# Patient Record
Sex: FEMALE | Race: WHITE | Employment: OTHER | URBAN - METROPOLITAN AREA
[De-identification: names, ages, dates, MRNs, and addresses within clinical notes are randomized per-mention and may not be internally consistent; named-entity substitution may affect disease eponyms.]

---

## 2019-07-14 ENCOUNTER — HOSPITAL ENCOUNTER (EMERGENCY)
Facility: CLINIC | Age: 84
Discharge: HOME | End: 2019-07-14
Attending: PHYSICIAN ASSISTANT | Admitting: PHYSICIAN ASSISTANT
Payer: COMMERCIAL

## 2019-07-14 ENCOUNTER — APPOINTMENT (OUTPATIENT)
Dept: GENERAL RADIOLOGY | Facility: CLINIC | Age: 84
End: 2019-07-14
Attending: PHYSICIAN ASSISTANT
Payer: COMMERCIAL

## 2019-07-14 VITALS
OXYGEN SATURATION: 94 % | BODY MASS INDEX: 28.32 KG/M2 | TEMPERATURE: 98.4 F | HEART RATE: 82 BPM | RESPIRATION RATE: 22 BRPM | HEIGHT: 65 IN | SYSTOLIC BLOOD PRESSURE: 151 MMHG | DIASTOLIC BLOOD PRESSURE: 80 MMHG | WEIGHT: 170 LBS

## 2019-07-14 DIAGNOSIS — R42 DIZZINESS: ICD-10-CM

## 2019-07-14 DIAGNOSIS — R06.02 SHORTNESS OF BREATH: ICD-10-CM

## 2019-07-14 LAB
ALBUMIN UR-MCNC: NEGATIVE MG/DL
ANION GAP SERPL CALCULATED.3IONS-SCNC: 2 MMOL/L (ref 3–14)
APPEARANCE UR: CLEAR
BACTERIA #/AREA URNS HPF: ABNORMAL /HPF
BASOPHILS # BLD AUTO: 0.1 10E9/L (ref 0–0.2)
BASOPHILS NFR BLD AUTO: 0.9 %
BILIRUB UR QL STRIP: NEGATIVE
BUN SERPL-MCNC: 11 MG/DL (ref 7–30)
CALCIUM SERPL-MCNC: 10.5 MG/DL (ref 8.5–10.1)
CHLORIDE SERPL-SCNC: 105 MMOL/L (ref 94–109)
CO2 SERPL-SCNC: 35 MMOL/L (ref 20–32)
COLOR UR AUTO: YELLOW
CREAT SERPL-MCNC: 0.68 MG/DL (ref 0.52–1.04)
DIFFERENTIAL METHOD BLD: NORMAL
EOSINOPHIL # BLD AUTO: 0.3 10E9/L (ref 0–0.7)
EOSINOPHIL NFR BLD AUTO: 4.5 %
ERYTHROCYTE [DISTWIDTH] IN BLOOD BY AUTOMATED COUNT: 11.9 % (ref 10–15)
GFR SERPL CREATININE-BSD FRML MDRD: 79 ML/MIN/{1.73_M2}
GLUCOSE SERPL-MCNC: 112 MG/DL (ref 70–99)
GLUCOSE UR STRIP-MCNC: NEGATIVE MG/DL
HCT VFR BLD AUTO: 43.8 % (ref 35–47)
HGB BLD-MCNC: 13.9 G/DL (ref 11.7–15.7)
HGB UR QL STRIP: NEGATIVE
IMM GRANULOCYTES # BLD: 0 10E9/L (ref 0–0.4)
IMM GRANULOCYTES NFR BLD: 0.1 %
KETONES UR STRIP-MCNC: NEGATIVE MG/DL
LEUKOCYTE ESTERASE UR QL STRIP: ABNORMAL
LYMPHOCYTES # BLD AUTO: 1.5 10E9/L (ref 0.8–5.3)
LYMPHOCYTES NFR BLD AUTO: 20.4 %
MCH RBC QN AUTO: 30.3 PG (ref 26.5–33)
MCHC RBC AUTO-ENTMCNC: 31.7 G/DL (ref 31.5–36.5)
MCV RBC AUTO: 96 FL (ref 78–100)
MONOCYTES # BLD AUTO: 0.7 10E9/L (ref 0–1.3)
MONOCYTES NFR BLD AUTO: 9.7 %
MUCOUS THREADS #/AREA URNS LPF: PRESENT /LPF
NEUTROPHILS # BLD AUTO: 4.8 10E9/L (ref 1.6–8.3)
NEUTROPHILS NFR BLD AUTO: 64.4 %
NITRATE UR QL: NEGATIVE
NRBC # BLD AUTO: 0 10*3/UL
NRBC BLD AUTO-RTO: 0 /100
NT-PROBNP SERPL-MCNC: 53 PG/ML (ref 0–1800)
PH UR STRIP: 6.5 PH (ref 5–7)
PLATELET # BLD AUTO: 233 10E9/L (ref 150–450)
POTASSIUM SERPL-SCNC: 4.4 MMOL/L (ref 3.4–5.3)
RBC # BLD AUTO: 4.58 10E12/L (ref 3.8–5.2)
RBC #/AREA URNS AUTO: 3 /HPF (ref 0–2)
SODIUM SERPL-SCNC: 142 MMOL/L (ref 133–144)
SOURCE: ABNORMAL
SP GR UR STRIP: 1.02 (ref 1–1.03)
SQUAMOUS #/AREA URNS AUTO: 2 /HPF (ref 0–1)
TROPONIN I SERPL-MCNC: <0.015 UG/L (ref 0–0.04)
TROPONIN I SERPL-MCNC: <0.015 UG/L (ref 0–0.04)
UROBILINOGEN UR STRIP-MCNC: NORMAL MG/DL (ref 0–2)
WBC # BLD AUTO: 7.4 10E9/L (ref 4–11)
WBC #/AREA URNS AUTO: 7 /HPF (ref 0–5)

## 2019-07-14 PROCEDURE — 96360 HYDRATION IV INFUSION INIT: CPT

## 2019-07-14 PROCEDURE — 84484 ASSAY OF TROPONIN QUANT: CPT | Mod: 91 | Performed by: PHYSICIAN ASSISTANT

## 2019-07-14 PROCEDURE — 99285 EMERGENCY DEPT VISIT HI MDM: CPT | Mod: 25

## 2019-07-14 PROCEDURE — 25000125 ZZHC RX 250: Performed by: PHYSICIAN ASSISTANT

## 2019-07-14 PROCEDURE — 80048 BASIC METABOLIC PNL TOTAL CA: CPT | Performed by: PHYSICIAN ASSISTANT

## 2019-07-14 PROCEDURE — 85025 COMPLETE CBC W/AUTO DIFF WBC: CPT | Performed by: PHYSICIAN ASSISTANT

## 2019-07-14 PROCEDURE — 93005 ELECTROCARDIOGRAM TRACING: CPT

## 2019-07-14 PROCEDURE — 81001 URINALYSIS AUTO W/SCOPE: CPT | Performed by: PHYSICIAN ASSISTANT

## 2019-07-14 PROCEDURE — 71046 X-RAY EXAM CHEST 2 VIEWS: CPT

## 2019-07-14 PROCEDURE — 25000128 H RX IP 250 OP 636: Performed by: PHYSICIAN ASSISTANT

## 2019-07-14 PROCEDURE — 83880 ASSAY OF NATRIURETIC PEPTIDE: CPT | Performed by: PHYSICIAN ASSISTANT

## 2019-07-14 PROCEDURE — 94640 AIRWAY INHALATION TREATMENT: CPT

## 2019-07-14 RX ORDER — IPRATROPIUM BROMIDE AND ALBUTEROL SULFATE 2.5; .5 MG/3ML; MG/3ML
3 SOLUTION RESPIRATORY (INHALATION) ONCE
Status: COMPLETED | OUTPATIENT
Start: 2019-07-14 | End: 2019-07-14

## 2019-07-14 RX ORDER — MECLIZINE HYDROCHLORIDE 25 MG/1
25 TABLET ORAL ONCE
Status: DISCONTINUED | OUTPATIENT
Start: 2019-07-14 | End: 2019-07-14 | Stop reason: HOSPADM

## 2019-07-14 RX ADMIN — IPRATROPIUM BROMIDE AND ALBUTEROL SULFATE 3 ML: .5; 3 SOLUTION RESPIRATORY (INHALATION) at 14:42

## 2019-07-14 RX ADMIN — SODIUM CHLORIDE 500 ML: 9 INJECTION, SOLUTION INTRAVENOUS at 14:41

## 2019-07-14 ASSESSMENT — ENCOUNTER SYMPTOMS
DIZZINESS: 1
FEVER: 0
SHORTNESS OF BREATH: 1
COUGH: 1

## 2019-07-14 ASSESSMENT — MIFFLIN-ST. JEOR: SCORE: 1206.99

## 2019-07-14 NOTE — ED PROVIDER NOTES
History     Chief Complaint:    Shortness of Breath and Dizziness     HPI   Leeanne Cowan is a 87 year old female who presents with shortness of breath and dizziness. The patient report that recently the heat has been bothering her and she has been feeling unwell. She notes that this morning she started to develop shortness of breath while she was walking with associated dizziness like she will fall over. The patient expresses that she also has had a dry nonproductive cough recently. She denies current dizziness, chest pain, leg swelling, or fever. She recalls that she had a DVT after pregnancy.     Allergies:  Sulfa Drugs    Meloxicam     Medications:    Flonase    Past Medical History:    DVT  Lumbar spondylosis  Allergies  KINDRA  Mixed hearing loss  BPPV  Macular hole  Atrial fibrillation  Cardiac dysrhythmia  Aortic dilation  COPD  Sick sinus syndrome  Basal cell cancer  Thoracic aortic aneurysm     Past Surgical History:    Cardiac pacemaker  Appendectomy   Cholecystectomy  Cataract extraction  Hysterectomy  MOHS Surgery  Knee surgery     Family History:    Father: heart disease  Mother: stroke  Cousin: breat cancer     Social History:  The patient was accompanied to the ED by daughter.  Smoking Status: Former Smoker  Smokeless Tobacco: Never Used  Alcohol Use: Positive  Drug Use: Negative  Marital Status:        Review of Systems   Constitutional: Negative for fever.   Respiratory: Positive for cough and shortness of breath.    Cardiovascular: Negative for chest pain and leg swelling.   Neurological: Positive for dizziness.   All other systems reviewed and are negative.     Physical Exam     Patient Vitals for the past 24 hrs:   BP Temp Temp src Pulse Heart Rate Resp SpO2 Height Weight   07/14/19 1531 -- -- -- -- -- -- 94 % -- --   07/14/19 1530 151/80 -- -- 82 79 -- -- -- --   07/14/19 1515 -- -- -- -- 78 -- 94 % -- --   07/14/19 1500 146/81 -- -- 76 -- -- 100 % -- --   07/14/19 1445 144/83 -- --  "-- 76 -- 93 % -- --   07/14/19 1415 -- -- -- -- 78 -- 94 % -- --   07/14/19 1315 135/79 -- -- 80 75 -- 94 % -- --   07/14/19 1312 135/79 -- -- 80 -- -- -- -- --   07/14/19 1311 -- -- -- -- -- -- 94 % -- --   07/14/19 1233 143/90 98.4  F (36.9  C) Oral 83 83 22 96 % 1.651 m (5' 5\") 77.1 kg (170 lb)      Physical Exam  Constitutional: Patient appears well-developed and well-nourished.   Head: No external signs of trauma noted.  Eyes: Pupils are equal, round, and reactive to light. EOMI.   Ears: external ears normal.  Nose: without deformity  Mouth: MMM  Cardiovascular: Normal rate, regular rhythm, normal heart sounds and intact distal pulses.    Respiratory: Effort normal and breath sounds normal. No respiratory distress. No wheezes noted.   GI: Soft. There is no tenderness. There is no rebound or guarding.   Musculoskeletal: No deformities appreciated. No edema noted  Neurologic:    Patient is alert and oriented to person, place, and time.    Speech is fluent, cognition is normal.   CN 2-12 intact (PERRL, EOMI, symmetric smile, equal eye squeeze and forehead raise, normal and equal sensation to bilateral forehead/cheek/chin, equal hearing to finger rub, midline tongue protrusion with nl side-to-side movement, normal shoulder shrug).    RUE strength 5/5: , finger abd, wrist flex/ext, elbow flex/ext.    LUE strength 5/5: , finger abd, wrist flex/ext, elbow flex/ext.    RLE strength 5/5: ankle flex/ext, knee flex/ext, hip flex.    LLE strength 5/5: ankle flex/ext, knee flex/ext, hip flex.    Sensation equal in all 4 extremities.    No arm drift.     Cerebellar: Normal rapid alternating movements (finger-nose-finger, rapid pronation/supination)   Normal gait.   Psychiatric: Normal mood, affect, and behavior.  Skin: Skin is warm and dry.       Emergency Department Course     ECG:  ECG taken at 1235, ECG read at 1240  Atrial-paced rhythm with prolonged AV conductions with premature atrial complexes  Low voltage " QRS  Abnormal ECG  Rate 83 bpm. NM interval 226 ms. QRS duration 92 ms. QT/QTc 368/432 ms. P-R-T axes 82 49 70.    Imaging:  Radiology findings were communicated with the patient who voiced understanding of the findings.    XR Chest 2 Views  Probable COPD and borderline cardiomegaly. No acute  abnormality.   AMANDA DAWSON MD  Reading per radiology    Laboratory:  Laboratory findings were communicated with the patietn who voiced understanding of the findings.    UA: Leukocyte Esterase moderate (A), WBC 7 (H), RBC 3 (H), Squamous Epithelial 2 (H), Mucous present (A), o/w WNL.  CBC: WBC 7.4, HGB 13.9,   BMP: Carbon Dioxide 35 (H), Anion Gap 2 (L), Glucose 112 (H), Calcium 10.5 (H) o/w WNL (Creatinine 0.68)  Troponin (Collected 1254): <0.015   Troponin (Collected 1508): <0.015   Nt probnp inpatient: 53    Interventions:  Medications   meclizine (ANTIVERT) tablet 25 mg (25 mg Oral Not Given 7/14/19 1445)   0.9% sodium chloride BOLUS (0 mLs Intravenous Stopped 7/14/19 1530)   ipratropium - albuterol 0.5 mg/2.5 mg/3 mL (DUONEB) neb solution 3 mL (3 mLs Nebulization Given 7/14/19 1442)     Emergency Department Course:    1235 EKG obtained as noted above.     1245 Nursing notes and vitals reviewed. I performed an exam of the patient as documented above.     1254 IV was inserted and blood was drawn for laboratory testing, results above.     1332 The patient provided a urine sample here in the emergency department. This was sent for laboratory testing, findings above.     1430 Patient rechecked and updated.      1539 Prior to discharge, I personally reviewed the imaging, lab, and EKG results with the patient and answered all related questions. Patient is amenable to plan.     Impression & Plan      Medical Decision Making:  Leeanne Cowan is a 87 year old female who presents to the emergency department today for evaluation of dizziness and shortness of breath. She is afebrile on arrival with normal oxygen  saturations. Lungs are clear bilaterally. EKG does not reveal any evidence of acute ischemia. Troponin is negative x 2 and she does not have any chest pain. I doubt ACS at this time. The remainder of her labs are unrevealing. CXR does not reveal any evidence of pneumonia, pneumothorax, or pulmonary edema. There is no evidence of CHF at this time. I have low suspicion for PE at this time. Urinalysis does not reveal any obvious evidence of infection and she is not having any urinary symptoms. Her symptoms have improved with interventions here. She has maintained normal oxygen saturations and is no longer dizzy. She is able to ambulate without any significant symptoms at this time. Therefore, I think she is appropriate for discharge home at this time with close follow-up with her primary care provider. She was instructed to return to the ED for any new or worsening symptoms.     Diagnosis:    ICD-10-CM    1. Dizziness R42    2. Shortness of breath R06.02      Disposition:   The patient is discharged to home.     Discharge Medications:    No discharge medications.    Scribe Disclosure:  I, Orla Severson, am serving as a scribe at 12:39 PM on 7/14/2019 to document services personally performed by Maria Del Carmen Anthony PA-C based on my observations and the provider's statements to me.     [facility] EMERGENCY DEPARTMENT       Maria Del Carmen Anthony PA-C  07/14/19 3342

## 2019-07-14 NOTE — ED AVS SNAPSHOT
[facility] Emergency Department  [address]  [facility] [address]  Phone:  [phone]  Fax:  [phone]                                    Leeanne Cowan   MRN: [id number]    Department:  [facility] Emergency Department   Date of Visit:  7/14/2019           After Visit Summary Signature Page    I have received my discharge instructions, and my questions have been answered. I have discussed any challenges I see with this plan with the nurse or doctor.    ..........................................................................................................................................  Patient/Patient Representative Signature      ..........................................................................................................................................  Patient Representative Print Name and Relationship to Patient    ..................................................               ................................................  Date                                   Time    ..........................................................................................................................................  Reviewed by Signature/Title    ...................................................              ..............................................  Date                                               Time          22EPIC Rev 08/18

## 2019-07-14 NOTE — ED NOTES
Patient presents with increased shortness of breath the past 3 days with dizziness that started this morning while at [employer]. ABCDs intact, alert and oriented x 4.

## 2019-07-15 LAB — INTERPRETATION ECG - MUSE: NORMAL
